# Patient Record
Sex: FEMALE | Race: WHITE | Employment: OTHER | URBAN - METROPOLITAN AREA
[De-identification: names, ages, dates, MRNs, and addresses within clinical notes are randomized per-mention and may not be internally consistent; named-entity substitution may affect disease eponyms.]

---

## 2019-02-12 PROBLEM — F32.A ANXIETY AND DEPRESSION: Status: ACTIVE | Noted: 2019-02-12

## 2019-02-12 PROBLEM — E66.09 CLASS 1 OBESITY DUE TO EXCESS CALORIES WITHOUT SERIOUS COMORBIDITY WITH BODY MASS INDEX (BMI) OF 33.0 TO 33.9 IN ADULT: Status: ACTIVE | Noted: 2019-02-12

## 2019-02-12 PROBLEM — F41.9 ANXIETY AND DEPRESSION: Status: ACTIVE | Noted: 2019-02-12

## 2020-08-14 PROBLEM — M32.9 LUPUS (HCC): Status: ACTIVE | Noted: 2020-08-14

## 2022-03-18 PROBLEM — F33.2 DEPRESSION, MAJOR, SEVERE RECURRENCE (HCC): Status: ACTIVE | Noted: 2019-11-29

## 2022-03-19 PROBLEM — M32.9 LUPUS (HCC): Status: ACTIVE | Noted: 2020-08-14

## 2022-03-19 PROBLEM — E66.09 CLASS 1 OBESITY DUE TO EXCESS CALORIES WITHOUT SERIOUS COMORBIDITY WITH BODY MASS INDEX (BMI) OF 33.0 TO 33.9 IN ADULT: Status: ACTIVE | Noted: 2019-02-12

## 2022-03-19 PROBLEM — E03.9 ACQUIRED HYPOTHYROIDISM: Status: ACTIVE | Noted: 2019-02-12

## 2022-03-20 PROBLEM — F32.A ANXIETY AND DEPRESSION: Status: ACTIVE | Noted: 2019-02-12

## 2022-03-20 PROBLEM — F41.9 ANXIETY AND DEPRESSION: Status: ACTIVE | Noted: 2019-02-12

## 2022-12-13 ENCOUNTER — APPOINTMENT (OUTPATIENT)
Dept: VASCULAR SURGERY | Age: 37
End: 2022-12-13
Attending: EMERGENCY MEDICINE
Payer: OTHER GOVERNMENT

## 2022-12-13 ENCOUNTER — APPOINTMENT (OUTPATIENT)
Dept: GENERAL RADIOLOGY | Age: 37
End: 2022-12-13
Attending: EMERGENCY MEDICINE
Payer: OTHER GOVERNMENT

## 2022-12-13 ENCOUNTER — HOSPITAL ENCOUNTER (EMERGENCY)
Age: 37
Discharge: HOME | End: 2022-12-13
Attending: EMERGENCY MEDICINE
Payer: OTHER GOVERNMENT

## 2022-12-13 VITALS
OXYGEN SATURATION: 97 % | DIASTOLIC BLOOD PRESSURE: 85 MMHG | HEART RATE: 75 BPM | TEMPERATURE: 98.4 F | SYSTOLIC BLOOD PRESSURE: 136 MMHG | RESPIRATION RATE: 18 BRPM

## 2022-12-13 DIAGNOSIS — R07.9 CHEST PAIN, UNSPECIFIED TYPE: Primary | ICD-10-CM

## 2022-12-13 LAB
ALBUMIN SERPL-MCNC: 4 G/DL (ref 3.5–5)
ALBUMIN/GLOB SERPL: 1.1 {RATIO} (ref 1.1–2.2)
ALP SERPL-CCNC: 61 U/L (ref 45–117)
ALT SERPL-CCNC: 24 U/L (ref 12–78)
ANION GAP SERPL CALC-SCNC: 4 MMOL/L (ref 5–15)
AST SERPL-CCNC: 12 U/L (ref 15–37)
BASOPHILS # BLD: 0 K/UL (ref 0–0.1)
BASOPHILS NFR BLD: 0 % (ref 0–1)
BILIRUB SERPL-MCNC: 0.5 MG/DL (ref 0.2–1)
BUN SERPL-MCNC: 12 MG/DL (ref 6–20)
BUN/CREAT SERPL: 15 (ref 12–20)
CALCIUM SERPL-MCNC: 8.8 MG/DL (ref 8.5–10.1)
CHLORIDE SERPL-SCNC: 106 MMOL/L (ref 97–108)
CO2 SERPL-SCNC: 28 MMOL/L (ref 21–32)
COMMENT, HOLDF: NORMAL
CREAT SERPL-MCNC: 0.81 MG/DL (ref 0.55–1.02)
D DIMER PPP FEU-MCNC: 0.55 MG/L FEU (ref 0–0.65)
DIFFERENTIAL METHOD BLD: NORMAL
EOSINOPHIL # BLD: 0.2 K/UL (ref 0–0.4)
EOSINOPHIL NFR BLD: 2 % (ref 0–7)
ERYTHROCYTE [DISTWIDTH] IN BLOOD BY AUTOMATED COUNT: 13.7 % (ref 11.5–14.5)
GLOBULIN SER CALC-MCNC: 3.5 G/DL (ref 2–4)
GLUCOSE SERPL-MCNC: 116 MG/DL (ref 65–100)
HCG UR QL: NEGATIVE
HCT VFR BLD AUTO: 37.8 % (ref 35–47)
HGB BLD-MCNC: 12 G/DL (ref 11.5–16)
IMM GRANULOCYTES # BLD AUTO: 0 K/UL (ref 0–0.04)
IMM GRANULOCYTES NFR BLD AUTO: 0 % (ref 0–0.5)
LYMPHOCYTES # BLD: 1.6 K/UL (ref 0.8–3.5)
LYMPHOCYTES NFR BLD: 22 % (ref 12–49)
MCH RBC QN AUTO: 27 PG (ref 26–34)
MCHC RBC AUTO-ENTMCNC: 31.7 G/DL (ref 30–36.5)
MCV RBC AUTO: 84.9 FL (ref 80–99)
MONOCYTES # BLD: 0.4 K/UL (ref 0–1)
MONOCYTES NFR BLD: 5 % (ref 5–13)
NEUTS SEG # BLD: 5.1 K/UL (ref 1.8–8)
NEUTS SEG NFR BLD: 71 % (ref 32–75)
NRBC # BLD: 0 K/UL (ref 0–0.01)
NRBC BLD-RTO: 0 PER 100 WBC
PLATELET # BLD AUTO: 168 K/UL (ref 150–400)
PMV BLD AUTO: 11.8 FL (ref 8.9–12.9)
POTASSIUM SERPL-SCNC: 3.6 MMOL/L (ref 3.5–5.1)
PROT SERPL-MCNC: 7.5 G/DL (ref 6.4–8.2)
RBC # BLD AUTO: 4.45 M/UL (ref 3.8–5.2)
SAMPLES BEING HELD,HOLD: NORMAL
SODIUM SERPL-SCNC: 138 MMOL/L (ref 136–145)
TROPONIN-HIGH SENSITIVITY: 4 NG/L (ref 0–51)
TROPONIN-HIGH SENSITIVITY: 4 NG/L (ref 0–51)
WBC # BLD AUTO: 7.2 K/UL (ref 3.6–11)

## 2022-12-13 PROCEDURE — 96375 TX/PRO/DX INJ NEW DRUG ADDON: CPT

## 2022-12-13 PROCEDURE — 93970 EXTREMITY STUDY: CPT

## 2022-12-13 PROCEDURE — 71046 X-RAY EXAM CHEST 2 VIEWS: CPT

## 2022-12-13 PROCEDURE — 99285 EMERGENCY DEPT VISIT HI MDM: CPT

## 2022-12-13 PROCEDURE — 84484 ASSAY OF TROPONIN QUANT: CPT

## 2022-12-13 PROCEDURE — 81025 URINE PREGNANCY TEST: CPT

## 2022-12-13 PROCEDURE — 85379 FIBRIN DEGRADATION QUANT: CPT

## 2022-12-13 PROCEDURE — 80053 COMPREHEN METABOLIC PANEL: CPT

## 2022-12-13 PROCEDURE — 74011000250 HC RX REV CODE- 250: Performed by: EMERGENCY MEDICINE

## 2022-12-13 PROCEDURE — 74011250637 HC RX REV CODE- 250/637: Performed by: EMERGENCY MEDICINE

## 2022-12-13 PROCEDURE — 36415 COLL VENOUS BLD VENIPUNCTURE: CPT

## 2022-12-13 PROCEDURE — 96376 TX/PRO/DX INJ SAME DRUG ADON: CPT

## 2022-12-13 PROCEDURE — 85025 COMPLETE CBC W/AUTO DIFF WBC: CPT

## 2022-12-13 PROCEDURE — 74011250636 HC RX REV CODE- 250/636: Performed by: EMERGENCY MEDICINE

## 2022-12-13 PROCEDURE — 96374 THER/PROPH/DIAG INJ IV PUSH: CPT

## 2022-12-13 PROCEDURE — 93005 ELECTROCARDIOGRAM TRACING: CPT

## 2022-12-13 RX ORDER — KETOROLAC TROMETHAMINE 30 MG/ML
30 INJECTION, SOLUTION INTRAMUSCULAR; INTRAVENOUS
Status: COMPLETED | OUTPATIENT
Start: 2022-12-13 | End: 2022-12-13

## 2022-12-13 RX ORDER — HYDROMORPHONE HYDROCHLORIDE 1 MG/ML
0.5 INJECTION, SOLUTION INTRAMUSCULAR; INTRAVENOUS; SUBCUTANEOUS
Status: DISCONTINUED | OUTPATIENT
Start: 2022-12-13 | End: 2022-12-13

## 2022-12-13 RX ORDER — HYDROMORPHONE HYDROCHLORIDE 1 MG/ML
0.5 INJECTION, SOLUTION INTRAMUSCULAR; INTRAVENOUS; SUBCUTANEOUS
Status: COMPLETED | OUTPATIENT
Start: 2022-12-13 | End: 2022-12-13

## 2022-12-13 RX ADMIN — Medication 40 ML: at 18:03

## 2022-12-13 RX ADMIN — KETOROLAC TROMETHAMINE 30 MG: 30 INJECTION, SOLUTION INTRAMUSCULAR; INTRAVENOUS at 19:36

## 2022-12-13 RX ADMIN — KETOROLAC TROMETHAMINE 30 MG: 30 INJECTION, SOLUTION INTRAMUSCULAR; INTRAVENOUS at 16:56

## 2022-12-13 RX ADMIN — HYDROMORPHONE HYDROCHLORIDE 0.5 MG: 1 INJECTION, SOLUTION INTRAMUSCULAR; INTRAVENOUS; SUBCUTANEOUS at 15:44

## 2022-12-13 RX ADMIN — METHYLPREDNISOLONE SODIUM SUCCINATE 40 MG: 40 INJECTION, POWDER, FOR SOLUTION INTRAMUSCULAR; INTRAVENOUS at 15:43

## 2022-12-13 NOTE — ED PROVIDER NOTES
Monique Leung is a 41 yo F with h/o Lupus who presents to the ED with via EMS with chest pain. She first noticed the pain when she got to work this morning and it has been increasing to the point of distraction. She has family history of heart disease and was concerned that she may be having a heart attack. Her coworkers called [phone]. She received 324 aspirin and SL NTG by EMS. She reports the NTG initially helped her chest pain but now it is returning. The pain increases when she takes a deep breath. She has had bilateral lower leg swelling but she had not thought this unusual as  she was attributing it to her Lupus.            Past Medical History:   Diagnosis Date    Anxiety and depression     HPV in female     Thyroid disease     hypothyroid       Past Surgical History:   Procedure Laterality Date    HX CHOLECYSTECTOMY      HX LEEP PROCEDURE           Family History:   Problem Relation Age of Onset    Headache Mother     Migraines Mother     Stroke Mother     Other Mother         Borderline Personality Disorder    Diabetes Father     Hypertension Father     Prostate Cancer Father     No Known Problems Brother        Social History     Socioeconomic History    Marital status:      Spouse name: Not on file    Number of children: Not on file    Years of education: Not on file    Highest education level: Not on file   Occupational History    Not on file   Tobacco Use    Smoking status: Never    Smokeless tobacco: Never   Substance and Sexual Activity    Alcohol use: No    Drug use: No    Sexual activity: Yes     Partners: Male     Birth control/protection: Surgical   Other Topics Concern    Not on file   Social History Narrative    Not on file     Social Determinants of Health     Financial Resource Strain: Not on file   Food Insecurity: Not on file   Transportation Needs: Not on file   Physical Activity: Not on file   Stress: Not on file   Social Connections: Not on file   Intimate Partner Violence: Not on file   Housing Stability: Not on file         ALLERGIES: Clavulanic acid, Iodine, Shellfish derived, Augmentin [amoxicillin-pot clavulanate], and Guiafen ii dm [phenylephrine-dm-guaifenesin]    Review of Systems   Constitutional:  Negative for fever. HENT:  Negative for sore throat. Eyes:  Negative for visual disturbance. Respiratory:  Negative for cough. Cardiovascular:  Positive for chest pain and leg swelling. Gastrointestinal:  Negative for abdominal pain. Genitourinary:  Negative for dysuria. Musculoskeletal:  Negative for back pain. Skin:  Negative for rash. Neurological:  Negative for headaches. Vitals:    12/13/22 1353   BP: 116/72   Pulse: 85   Resp: 18   Temp: 98.4 °F (36.9 °C)   SpO2: 100%            Physical Exam  Vitals and nursing note reviewed. Constitutional:       General: She is not in acute distress. Appearance: She is well-developed. HENT:      Head: Normocephalic and atraumatic. Mouth/Throat:      Mouth: Mucous membranes are moist.   Eyes:      Extraocular Movements: Extraocular movements intact. Conjunctiva/sclera: Conjunctivae normal.   Neck:      Trachea: Phonation normal.   Cardiovascular:      Rate and Rhythm: Normal rate. Pulmonary:      Effort: Pulmonary effort is normal. No respiratory distress. Breath sounds: No wheezing or rales. Abdominal:      General: There is no distension. Musculoskeletal:         General: No tenderness. Normal range of motion. Cervical back: Normal range of motion. Skin:     General: Skin is warm and dry. Neurological:      General: No focal deficit present. Mental Status: She is alert. She is not disoriented. Motor: No abnormal muscle tone. ED EKG interpretation:  Rhythm: normal sinus rhythm; and regular .  Rate (approx.): 63; Axis: normal; P wave: normal; QRS interval: low voltage; ST/T wave: normal; Other findings: normal. This EKG was interpreted by Viviana Guillory MD,ED Provider. MDM  Pleuritic chest pain, leg swelling, family history of ACS. Consider MI, PE. Troponin and ddimer ordered. PAtient has h/o Iodine allergy. Will order hydromorphone for pain as well as solumedrol 40mg now incase CTA indicated. BLE Vascular US ordered. 3:48 PM  Repeat EKG obtained due to return of pain. Remains unchanged from prior obtained upon arrival.      7:50 PM  Patient reassessed and pain improved with toradol. Repeat troponin normal.  Ddimer normal.  Will discharge home. Given family history of CAD will refer to cardiology.       Procedures

## 2022-12-14 LAB
ATRIAL RATE: 63 BPM
CALCULATED P AXIS, ECG09: 58 DEGREES
CALCULATED R AXIS, ECG10: 30 DEGREES
CALCULATED T AXIS, ECG11: 46 DEGREES
DIAGNOSIS, 93000: NORMAL
P-R INTERVAL, ECG05: 184 MS
Q-T INTERVAL, ECG07: 424 MS
QRS DURATION, ECG06: 74 MS
QTC CALCULATION (BEZET), ECG08: 433 MS
VENTRICULAR RATE, ECG03: 63 BPM

## 2022-12-14 NOTE — ED NOTES
Bedside and Verbal shift change report given to UBALDO WEBSTER (oncoming nurse) by Nolberto Barthel, RN (offgoing nurse). Report included the following information SBAR, Kardex, ED Summary, [facility] and Recent Results.

## 2022-12-14 NOTE — ED NOTES
I have reviewed discharge instructions with the patient. The patient verbalized understanding. She left ambulatory w/  in no acute distress.

## 2022-12-29 ENCOUNTER — OFFICE VISIT (OUTPATIENT)
Dept: CARDIOLOGY CLINIC | Age: 37
End: 2022-12-29
Payer: OTHER GOVERNMENT

## 2022-12-29 VITALS
OXYGEN SATURATION: 99 % | SYSTOLIC BLOOD PRESSURE: 100 MMHG | HEART RATE: 75 BPM | WEIGHT: 203 LBS | BODY MASS INDEX: 35.97 KG/M2 | RESPIRATION RATE: 16 BRPM | HEIGHT: 63 IN | DIASTOLIC BLOOD PRESSURE: 60 MMHG

## 2022-12-29 DIAGNOSIS — R07.9 CHEST PAIN, UNSPECIFIED TYPE: Primary | ICD-10-CM

## 2022-12-29 DIAGNOSIS — M32.9 LUPUS (HCC): ICD-10-CM

## 2022-12-29 DIAGNOSIS — R06.02 SHORT OF BREATH ON EXERTION: ICD-10-CM

## 2022-12-29 DIAGNOSIS — Z82.49 FAMILY HISTORY OF EARLY CAD: ICD-10-CM

## 2022-12-29 PROCEDURE — 99214 OFFICE O/P EST MOD 30 MIN: CPT | Performed by: INTERNAL MEDICINE

## 2022-12-29 NOTE — PROGRESS NOTES
[facility] [place]: Jill Brunner  ([phone]) [phone]    HPI:  Ms. Oliver Chirinos is a 41 yo F with a history of lupus since 2015 often associated with joint pain, family history of CAD, referred by Dr. Torres Romo for cardiac evaluation. She went to the emergency room recently for chest pain. She was at work and started having severe chest discomfort. Initially thought it might be muscular in nature and took ibuprofen however it was persistent. She is a called EMS that she went to the emergency room. Work-up there was overall unrevealing. Over the next couple of days eventually the pain resolved, but she still does have occasional recurrent discomfort. This past weekend it recurred having two occasions lasting 10 to 20 minutes at a time, can happen with rest or exertion. Sometimes she sits in a \"tripod\" at home to help. She has been more easily short of breath with exertion. Although she has lupus she says she does not see anybody regularly for this. She is compensated on exam clear lungs and normal extremity edema. Fam hx. Early CAD with father and mother. Soc hx. 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  Assessment/Plan:  Unstable angina. Chest pain, shortness of breath with typical/atypical features; will proceed with an echo and stress test for further evaluation. This unrevealing consider musculoskeletal or GI etiology   Lupus  Family history of early coronary artery disease       She  has a past medical history of Anxiety and depression, HPV in female, and Thyroid disease. All other systems negative except as above. PE  There were no vitals filed for this visit. There is no height or weight on file to calculate BMI.   General appearance - alert, well appearing, and in no distress  Mental status - affect appropriate to mood  Eyes - sclera anicteric, moist mucous membranes  Neck - supple, no JVD  Chest - clear to auscultation, no wheezes, rales or rhonchi  Heart - normal rate, regular rhythm, normal S1, S2, no murmurs, rubs, clicks or gallops  Abdomen - soft, nontender, nondistended, no masses or organomegaly  Back exam - full range of motion, no tenderness  Neurological - no focal deficits  Extremities - peripheral pulses normal, no pedal edema      Recent Labs:  Lab Results   Component Value Date/Time    Cholesterol, total 184 02/12/2019 12:11 PM    HDL Cholesterol 48 02/12/2019 12:11 PM    LDL, calculated 115 (H) 02/12/2019 12:11 PM    Triglyceride 105 02/12/2019 12:11 PM     Lab Results   Component Value Date/Time    Creatinine 0.81 12/13/2022 02:21 PM     Lab Results   Component Value Date/Time    BUN 12 12/13/2022 02:21 PM     Lab Results   Component Value Date/Time    Potassium 3.6 12/13/2022 02:21 PM     Lab Results   Component Value Date/Time    Hemoglobin A1c 5.2 11/29/2019 04:20 PM     Lab Results   Component Value Date/Time    HGB 12.0 12/13/2022 02:21 PM     Lab Results   Component Value Date/Time    PLATELET 884 56/30/7153 02:21 PM       Reviewed:  Past Medical History:   Diagnosis Date    Anxiety and depression     HPV in female     Thyroid disease     hypothyroid     Social History     Tobacco Use   Smoking Status Never   Smokeless Tobacco Never     Social History     Substance and Sexual Activity   Alcohol Use No     Allergies   Allergen Reactions    Clavulanic Acid Nausea and Vomiting    Iodine Rash    Shellfish Derived Hives    Augmentin [Amoxicillin-Pot Clavulanate] Nausea and Vomiting    Guiafen Ii Dm [Phenylephrine-Dm-Guaifenesin] Diarrhea       Current Outpatient Medications   Medication Sig    ergocalciferol (ERGOCALCIFEROL) 1,250 mcg (50,000 unit) capsule Take 1 Capsule by mouth every seven (7) days. levothyroxine (SYNTHROID) 100 mcg tablet Take 1 Tablet by mouth Daily (before breakfast). DULoxetine (CYMBALTA) 20 mg capsule Take 1 Capsule by mouth daily. ibuprofen (MOTRIN) 800 mg tablet Take 800 mg by mouth two (2) times a day. No current facility-administered medications for this visit. Eboni Colin MD  [employer] [facility] [place]  [address] [facility] [address]  [facility], [address]

## 2022-12-29 NOTE — LETTER
Patient:  Tima Gave   YOB: 1985  Date of Visit: 12/29/2022      Dear Tiffanie Olivera NP  [place] [phone]  Via In Basket:      Ms. Monika Cleary is a 43 yo F with a history of lupus since 2015 often associated with joint pain, family history of CAD, referred by Dr. Nic Bird for cardiac evaluation. She went to the emergency room recently for chest pain. She was at work and started having severe chest discomfort. Initially thought it might be muscular in nature and took ibuprofen however it was persistent. She is a called EMS that she went to the emergency room. Work-up there was overall unrevealing. Over the next couple of days eventually the pain resolved, but she still does have occasional recurrent discomfort. This past weekend it recurred having two occasions lasting 10 to 20 minutes at a time, can happen with rest or exertion. Sometimes she sits in a \"tripod\" at home to help. She has been more easily short of breath with exertion. Although she has lupus she says she does not see anybody regularly for this. She is compensated on exam clear lungs and normal extremity edema. Fam hx. Early CAD with father and mother. Soc hx. 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  Assessment/Plan:  1. Unstable angina. Chest pain, shortness of breath with typical/atypical features; will proceed with an echo and stress test for further evaluation. This unrevealing consider musculoskeletal or GI etiology   2. Lupus  3. Family history of early coronary artery disease     If you have questions, please do not hesitate to call me.      Sincerely,      Carlos Atkinson MD

## 2022-12-29 NOTE — PATIENT INSTRUCTIONS
Schedule echocardiogram.    You will be scheduled for a routine exercise treadmill test after your appointment today. Please wear comfortable clothing (shorts or pants with a shirt or blouse) and walking/athletic shoes. Do not eat or drink anything, except water, for at least 2 hours prior to your test.    Do take your scheduled medications prior to your test.     Our office will call you results.

## 2023-05-21 RX ORDER — DULOXETIN HYDROCHLORIDE 20 MG/1
20 CAPSULE, DELAYED RELEASE ORAL DAILY
COMMUNITY
Start: 2021-11-08

## 2023-05-21 RX ORDER — LEVOTHYROXINE SODIUM 0.1 MG/1
100 TABLET ORAL
COMMUNITY
Start: 2021-11-12

## 2023-05-21 RX ORDER — IBUPROFEN 800 MG/1
800 TABLET ORAL 2 TIMES DAILY
COMMUNITY

## 2023-05-21 RX ORDER — ERGOCALCIFEROL 1.25 MG/1
50000 CAPSULE ORAL
COMMUNITY
Start: 2021-11-12

## 2025-05-01 NOTE — ED TRIAGE NOTES
Patient stated she has a follow up appointment with a provider. Nothing further required at this time.    Pt reports CP that started while at work. Pt reports pain radiated down LEFT arm. Pt given Nitro sublingual in route. Pt reports new onset of headache on arrival following medication.